# Patient Record
Sex: FEMALE | Race: WHITE | URBAN - METROPOLITAN AREA
[De-identification: names, ages, dates, MRNs, and addresses within clinical notes are randomized per-mention and may not be internally consistent; named-entity substitution may affect disease eponyms.]

---

## 2020-08-12 ENCOUNTER — HOSPITAL ENCOUNTER (OUTPATIENT)
Age: 20
Setting detail: SPECIMEN
Discharge: HOME | End: 2020-08-12
Payer: MEDICARE

## 2020-08-13 ENCOUNTER — NURSE ONLY (OUTPATIENT)
Dept: OBGYN CLINIC | Age: 20
End: 2020-08-13

## 2020-08-13 LAB
DIRECT EXAM: ABNORMAL
Lab: ABNORMAL
SPECIMEN DESCRIPTION: ABNORMAL

## 2020-08-14 LAB
C TRACH DNA GENITAL QL NAA+PROBE: NEGATIVE
N. GONORRHOEAE DNA: NEGATIVE
SPECIMEN DESCRIPTION: NORMAL

## 2020-08-21 VITALS — HEIGHT: 61 IN

## 2020-08-21 PROBLEM — R10.2 PELVIC AND PERINEAL PAIN: Status: ACTIVE | Noted: 2020-08-21

## 2020-08-21 RX ORDER — METRONIDAZOLE 500 MG/1
500 TABLET ORAL 3 TIMES DAILY
COMMUNITY
End: 2020-09-15 | Stop reason: ALTCHOICE

## 2020-09-15 ENCOUNTER — OFFICE VISIT (OUTPATIENT)
Dept: OBGYN CLINIC | Age: 20
End: 2020-09-15
Payer: MEDICARE

## 2020-09-15 VITALS — WEIGHT: 198.2 LBS | BODY MASS INDEX: 37.45 KG/M2 | SYSTOLIC BLOOD PRESSURE: 117 MMHG | DIASTOLIC BLOOD PRESSURE: 82 MMHG

## 2020-09-15 PROCEDURE — 99213 OFFICE O/P EST LOW 20 MIN: CPT | Performed by: NURSE PRACTITIONER

## 2020-09-15 NOTE — PROGRESS NOTES
PROBLEM VISIT     Date of service: 9/15/2020    Kayley Lynne  Is a 21 y.o. female    PT's PCP is: Yaniar Booth PA-C     : 2000                                             Subjective:       No LMP recorded. (Menstrual status: Irregular periods). OB History    Para Term  AB Living   2 2 2     2   SAB TAB Ectopic Molar Multiple Live Births             2      # Outcome Date GA Lbr Carter/2nd Weight Sex Delivery Anes PTL Lv   2 Term 10/04/19 38w0d   F  Local  AVA   1 Term 17 38w0d   F Vag-Spont Local N AVA      Birth Comments: short cervix @ 20wks with bulging membranes      Complications: Other Excessive Bleeding        Social History     Tobacco Use   Smoking Status Former Smoker    Types: Cigarettes    Last attempt to quit: 2019    Years since quittin.1   Smokeless Tobacco Never Used        Social History     Substance and Sexual Activity   Alcohol Use Yes       Allergies: Benzocaine      Current Outpatient Medications:     Etonogestrel (Dianne Moritz SC), Inject into the skin, Disp: , Rfl:     Social History     Substance and Sexual Activity   Sexual Activity Yes    Partners: Male    Birth control/protection: Implant       Chief Complaint   Patient presents with    Follow-up     follow up usn for low abd/pelvic pain. Most of pain is in low back area. PE:  Vital Signs  Blood pressure 117/82, weight 198 lb 3.2 oz (89.9 kg). HPI: Patient is here following usn of low back pain/pelvic pain. Reports has been present since delivery of 1st child in  and has become more consistent since delivery of 2nd child, 2019. States it is dull/achy. Patients back always hurts, has scoliosis. States the pain starts low and shoots up spine. Reports there was an incident when she was walking where she injured back.  Encouraged chiropractic care      PT denies fever, chills, nausea and vomiting       Review of Systems   Gastrointestinal: Negative for abdominal distention, constipation and diarrhea. Genitourinary: Positive for pelvic pain. Musculoskeletal: Positive for back pain. Objective     Pelvic Exam: GENITAL/URINARY:  External Genitalia:  General appearance; normal, Hair distribution; normal, Lesions absent  Urethral Meatus:  Size normal, Location normal, Lesions absent, Prolapse absent  Urethra: Fullness absent, Masses absent  Bladder:  Fullness absent, Masses absent, Tenderness absent, Cystocele absent  Vagina:  General appearance normal, Estrogen effect normal, Discharge absent, Lesions absent, Pelvic support normal  Cervix:  General appearance normal, Lesions absent, Discharge absent, Tenderness absent, Enlargement absent, Nodularity absent  Uterus:  Size normal, Tenderness absent  Adenexa: Masses absent, Tenderness absent  Anus/Perineum:  Lesions absent and Masses absent                                    Vaginal discharge: no vaginal discharge     Assessment and Plan          Diagnosis Orders   1. Pelvic pain  Vaginitis DNA Probe   2. Chronic low back pain, unspecified back pain laterality, unspecified whether sciatica present         normal limits usn. Reports abd pain improved a little with treatment of BV, will repeat culture. Patient currently has Nexplanon If pelvic pain unresolved could move forth with diagnostic lap. Encouraged follow up with PCP for eval of back pain. I have discontinued Babita Bailey's metroNIDAZOLE. I am also having her maintain her Etonogestrel (Mireya Reasons). Return if symptoms worsen or fail to improve. There are no Patient Instructions on file for this visit. Over 50% of time spent on counseling and care coordination on: see assessment and plan,  She was also counseled on her preventative health maintenance recommendations and follow-up.         FF time: 15 min      Nuzhat Severrobert Ray,9/27/2020 10:47 AM

## 2020-09-16 ENCOUNTER — HOSPITAL ENCOUNTER (OUTPATIENT)
Age: 20
Setting detail: SPECIMEN
Discharge: HOME | End: 2020-09-16
Payer: MEDICARE

## 2020-09-16 LAB
DIRECT EXAM: NORMAL
Lab: NORMAL
SPECIMEN DESCRIPTION: NORMAL

## 2020-09-27 ASSESSMENT — ENCOUNTER SYMPTOMS
ABDOMINAL DISTENTION: 0
DIARRHEA: 0
CONSTIPATION: 0
BACK PAIN: 1

## 2021-06-15 ENCOUNTER — TELEPHONE (OUTPATIENT)
Dept: OBGYN CLINIC | Age: 21
End: 2021-06-15

## 2021-07-01 ENCOUNTER — PROCEDURE VISIT (OUTPATIENT)
Dept: OBGYN CLINIC | Age: 21
End: 2021-07-01
Payer: MEDICARE

## 2021-07-01 VITALS — DIASTOLIC BLOOD PRESSURE: 74 MMHG | WEIGHT: 207 LBS | SYSTOLIC BLOOD PRESSURE: 117 MMHG | BODY MASS INDEX: 39.11 KG/M2

## 2021-07-01 DIAGNOSIS — Z30.46 ENCOUNTER FOR REMOVAL OF SUBDERMAL CONTRACEPTIVE IMPLANT: Primary | ICD-10-CM

## 2021-07-01 PROCEDURE — 11982 REMOVE DRUG IMPLANT DEVICE: CPT | Performed by: NURSE PRACTITIONER

## 2021-07-01 RX ORDER — NAPROXEN 500 MG/1
500 TABLET ORAL 2 TIMES DAILY PRN
COMMUNITY
Start: 2020-11-03 | End: 2021-11-03

## 2021-07-01 RX ORDER — PROMETHAZINE HYDROCHLORIDE 25 MG/1
25 TABLET ORAL EVERY 6 HOURS PRN
COMMUNITY
Start: 2021-06-18 | End: 2022-06-18

## 2021-07-01 RX ORDER — ACETAMINOPHEN 325 MG/1
650 TABLET ORAL EVERY 6 HOURS PRN
COMMUNITY

## 2021-07-01 RX ORDER — IBUPROFEN 600 MG/1
600 TABLET ORAL EVERY 6 HOURS PRN
COMMUNITY

## 2021-07-01 NOTE — PROGRESS NOTES
24 y.o.  2021      Hussein Rodriguez is a 24 y.o. female is requesting to have her Nexplanon removed due to mood swings. She does not have any other problems today. She declines any hormonal form of cycle control. Past Medical History:   Diagnosis Date    Allergic headache     Allergic rhinitis     Anxiety     BV (bacterial vaginosis)     Depression          Past Surgical History:   Procedure Laterality Date    ADENOIDECTOMY      CHOLECYSTECTOMY      TONSILLECTOMY  2010    TYMPANOSTOMY TUBE PLACEMENT         Family History   Problem Relation Age of Onset    Diabetes Paternal Grandmother     Diabetes Maternal Grandmother     Stroke Maternal Grandmother     Alzheimer's Disease Maternal Grandfather     Diabetes Mother     Depression Mother     Osteoarthritis Mother     Mental Illness Brother     Asthma Sister     Mental Illness Sister        Social History     Tobacco Use    Smoking status: Former Smoker     Types: Cigarettes     Quit date: 2019     Years since quittin.8    Smokeless tobacco: Never Used   Vaping Use    Vaping Use: Never used   Substance Use Topics    Alcohol use: Yes    Drug use: Not Currently       Current Outpatient Medications on File Prior to Visit   Medication Sig Dispense Refill    naproxen (NAPROSYN) 500 MG tablet Take 500 mg by mouth 2 times daily as needed      promethazine (PHENERGAN) 25 MG tablet Take 25 mg by mouth every 6 hours as needed      acetaminophen (TYLENOL) 325 MG tablet Take 650 mg by mouth every 6 hours as needed      ibuprofen (ADVIL;MOTRIN) 600 MG tablet Take 600 mg by mouth every 6 hours as needed      Etonogestrel (NEXPLANON SC) Inject into the skin       No current facility-administered medications on file prior to visit.        Allergies as of 2021 - Fully Reviewed 2021   Allergen Reaction Noted    Benzocaine  09/15/2020         OB History    Para Term  AB Living   2 2 2     2   SAB TAB

## 2021-07-27 ENCOUNTER — HOSPITAL ENCOUNTER (OUTPATIENT)
Age: 21
Setting detail: SPECIMEN
Discharge: HOME | End: 2021-07-27
Payer: MEDICARE

## 2021-07-27 ENCOUNTER — OFFICE VISIT (OUTPATIENT)
Dept: OBGYN CLINIC | Age: 21
End: 2021-07-27
Payer: MEDICARE

## 2021-07-27 VITALS — WEIGHT: 208 LBS | BODY MASS INDEX: 39.3 KG/M2 | DIASTOLIC BLOOD PRESSURE: 82 MMHG | SYSTOLIC BLOOD PRESSURE: 127 MMHG

## 2021-07-27 DIAGNOSIS — Z01.419 WOMEN'S ANNUAL ROUTINE GYNECOLOGICAL EXAMINATION: Primary | ICD-10-CM

## 2021-07-27 PROCEDURE — 99395 PREV VISIT EST AGE 18-39: CPT | Performed by: NURSE PRACTITIONER

## 2021-07-27 NOTE — PROGRESS NOTES
YEARLY PHYSICAL    Date of service: 2021    Tino Valera  Is a 24 y.o.  female    PT's PCP is: Kaleigh Farfan PA-C     : 2000                                             Subjective:       Patient's last menstrual period was 2021. Are your menses regular: yes    OB History    Para Term  AB Living   2 2 2     2   SAB TAB Ectopic Molar Multiple Live Births             2      # Outcome Date GA Lbr Carter/2nd Weight Sex Delivery Anes PTL Lv   2 Term 10/04/19 38w0d   F  Local  AVA   1 Term 17 38w0d   F Vag-Spont Local N AVA      Birth Comments: short cervix @ 20wks with bulging membranes      Complications:  Other Excessive Bleeding        Social History     Tobacco Use   Smoking Status Former Smoker    Types: Cigarettes    Quit date: 2019    Years since quittin.9   Smokeless Tobacco Never Used        Social History     Substance and Sexual Activity   Alcohol Use Yes       Family History   Problem Relation Age of Onset    Diabetes Paternal Grandmother     Diabetes Maternal Grandmother     Stroke Maternal Grandmother     Alzheimer's Disease Maternal Grandfather     Diabetes Mother     Depression Mother     Osteoarthritis Mother     Mental Illness Brother     Asthma Sister     Mental Illness Sister        Any family history of breast or ovarian cancer: No    Any family history of blood clots: No    Allergies: Benzocaine      Current Outpatient Medications:     naproxen (NAPROSYN) 500 MG tablet, Take 500 mg by mouth 2 times daily as needed, Disp: , Rfl:     acetaminophen (TYLENOL) 325 MG tablet, Take 650 mg by mouth every 6 hours as needed, Disp: , Rfl:     ibuprofen (ADVIL;MOTRIN) 600 MG tablet, Take 600 mg by mouth every 6 hours as needed, Disp: , Rfl:     promethazine (PHENERGAN) 25 MG tablet, Take 25 mg by mouth every 6 hours as needed, Disp: , Rfl:     Social History     Substance and Sexual Activity   Sexual Activity Yes    Partners: Male    Birth control/protection: Condom       Any bleeding or pain with intercourse: No    Last pap: 2015    Last HPV: n/a    Last Mammogram: n/a    Do you do self breast exams: encouraged monthly SBE     Past Medical History:   Diagnosis Date    Allergic headache     Allergic rhinitis     Anxiety     BV (bacterial vaginosis)     Depression        Past Surgical History:   Procedure Laterality Date    ADENOIDECTOMY      CHOLECYSTECTOMY      TONSILLECTOMY  2010    TYMPANOSTOMY TUBE PLACEMENT  2002       Family History   Problem Relation Age of Onset    Diabetes Paternal Grandmother     Diabetes Maternal Grandmother     Stroke Maternal Grandmother     Alzheimer's Disease Maternal Grandfather     Diabetes Mother     Depression Mother     Osteoarthritis Mother     Mental Illness Brother     Asthma Sister     Mental Illness Sister        Chief Complaint   Patient presents with    Gynecologic Exam     Pap due. Feeling well, voices no concerns. Declines STD testing. PE:  Vital Signs  Blood pressure 127/82, weight 208 lb (94.3 kg), last menstrual period 07/21/2021. Estimated body mass index is 39.3 kg/m² as calculated from the following:    Height as of 8/21/20: 5' 1\" (1.549 m). Weight as of this encounter: 208 lb (94.3 kg). HPI: Patient presents today for annual exam. Feeling well, voices no concerns. Denies breast/pelvic pain. Due for pap, declines STD screening. Review of Systems   All other systems reviewed and are negative. Objective  Heent:   negative   Cor: regular rate and rhythm, no murmurs              Pul:clear to auscultation bilaterally- no wheezes, rales or rhonchi, normal air movement, no respiratory distress      GI: Abdomen soft, non-tender.  BS normal. No masses,  No organomegaly           Extremities: normal strength, tone, and muscle mass, ROM of all joints is normal   Breasts: Breast:normal appearance, no masses or tenderness, No nipple retraction or dimpling, No nipple discharge or bleeding   Pelvic Exam: GENITAL/URINARY:  External Genitalia:  General appearance; normal, Hair distribution; normal, Lesions absent  Urethral Meatus:  Size normal, Location normal, Lesions absent, Prolapse absent  Urethra: Fullness absent, Masses absent  Bladder:  Fullness absent, Masses absent, Tenderness absent, Cystocele absent  Vagina:  General appearance normal, Estrogen effect normal, Discharge absent, Lesions absent, Pelvic support normal  Cervix:  General appearance normal, Lesions absent, Discharge absent, Tenderness absent, Enlargement absent, Nodularity absent  Uterus:  Size normal, Tenderness absent  Adenexa: Masses absent, Tenderness absent  Anus/Perineum:  Lesions absent and Masses absent                                    Vaginal discharge: no vaginal discharge   Chaperone: not present                          Assessment and Plan          Diagnosis Orders   1. Women's annual routine gynecological examination  PAP SMEAR             I have discontinued Babita Bailey's Etonogestrel (Ranny Picket). I am also having her maintain her naproxen, acetaminophen, ibuprofen, and promethazine. Return in about 1 year (around 7/27/2022) for yearly. She was also counseled on her preventative health maintenance recommendations and follow-up. There are no Patient Instructions on file for this visit.     MAL Cabrales NP,7/27/2021 1:16 PM

## 2021-08-05 ENCOUNTER — OFFICE VISIT (OUTPATIENT)
Dept: OBGYN CLINIC | Age: 21
End: 2021-08-05
Payer: MEDICARE

## 2021-08-05 ENCOUNTER — HOSPITAL ENCOUNTER (OUTPATIENT)
Age: 21
Setting detail: SPECIMEN
Discharge: HOME | End: 2021-08-05
Payer: MEDICARE

## 2021-08-05 VITALS — WEIGHT: 208 LBS | BODY MASS INDEX: 39.3 KG/M2 | SYSTOLIC BLOOD PRESSURE: 120 MMHG | DIASTOLIC BLOOD PRESSURE: 80 MMHG

## 2021-08-05 DIAGNOSIS — N93.9 ABNORMAL UTERINE AND VAGINAL BLEEDING, UNSPECIFIED: Primary | ICD-10-CM

## 2021-08-05 DIAGNOSIS — N93.9 ABNORMAL UTERINE AND VAGINAL BLEEDING, UNSPECIFIED: ICD-10-CM

## 2021-08-05 LAB
DIRECT EXAM: ABNORMAL
HCG QUANTITATIVE: <1 IU/L
Lab: ABNORMAL
SPECIMEN DESCRIPTION: ABNORMAL

## 2021-08-05 PROCEDURE — 99213 OFFICE O/P EST LOW 20 MIN: CPT | Performed by: NURSE PRACTITIONER

## 2021-08-05 PROCEDURE — G8427 DOCREV CUR MEDS BY ELIG CLIN: HCPCS | Performed by: NURSE PRACTITIONER

## 2021-08-05 PROCEDURE — G8417 CALC BMI ABV UP PARAM F/U: HCPCS | Performed by: NURSE PRACTITIONER

## 2021-08-05 PROCEDURE — 1036F TOBACCO NON-USER: CPT | Performed by: NURSE PRACTITIONER

## 2021-08-05 NOTE — PROGRESS NOTES
PROBLEM VISIT     Date of service: 2021    Carolee Bassett  Is a 24 y.o. female    PT's PCP is: Domenic Skelton PA-C     : 2000                                             Subjective:       Patient's last menstrual period was 2021. OB History    Para Term  AB Living   2 2 2     2   SAB TAB Ectopic Molar Multiple Live Births             2      # Outcome Date GA Lbr Carter/2nd Weight Sex Delivery Anes PTL Lv   2 Term 10/04/19 38w0d   F  Local  AVA   1 Term 17 38w0d   F Vag-Spont Local N AVA      Birth Comments: short cervix @ 20wks with bulging membranes      Complications: Other Excessive Bleeding        Social History     Tobacco Use   Smoking Status Former Smoker    Types: Cigarettes    Quit date: 2019    Years since quittin.9   Smokeless Tobacco Never Used        Social History     Substance and Sexual Activity   Alcohol Use Yes       Allergies: Benzocaine      Current Outpatient Medications:     naproxen (NAPROSYN) 500 MG tablet, Take 500 mg by mouth 2 times daily as needed, Disp: , Rfl:     acetaminophen (TYLENOL) 325 MG tablet, Take 650 mg by mouth every 6 hours as needed, Disp: , Rfl:     ibuprofen (ADVIL;MOTRIN) 600 MG tablet, Take 600 mg by mouth every 6 hours as needed, Disp: , Rfl:     promethazine (PHENERGAN) 25 MG tablet, Take 25 mg by mouth every 6 hours as needed, Disp: , Rfl:     Social History     Substance and Sexual Activity   Sexual Activity Yes    Partners: Male    Birth control/protection: Condom     Chief Complaint   Patient presents with    Menstrual Problem     C/O having nl menses on , and then started bleeding heavily on . Went to ER and had labs and UPT done. Denies pain. PE:  Vital Signs  Blood pressure 120/80, weight 208 lb (94.3 kg), last menstrual period 2021. HPI: Patient presents for ER follow up. States LMP was  and then while working she started bleeding heavily on .  She was seen in the ER that evening- labs and UPT negative. Continues to have vaginal bleeding changing tampons 2-3 times daily. Desires serum hcg    PT denies fever, chills, nausea and vomiting       Review of Systems   Constitutional: Negative. Genitourinary: Positive for menstrual problem and vaginal bleeding. Negative for dysuria, frequency, pelvic pain and vaginal discharge. Physical Exam  Constitutional:       Appearance: Normal appearance. HENT:      Head: Normocephalic. Pulmonary:      Effort: Pulmonary effort is normal.   Genitourinary:     General: Normal vulva. Vagina: Bleeding present. No tenderness. Cervix: Normal.      Uterus: Not tender. Adnexa:         Right: No tenderness. Left: No tenderness. Musculoskeletal:         General: Normal range of motion. Neurological:      General: No focal deficit present. Mental Status: She is alert. Psychiatric:         Mood and Affect: Mood normal.         Behavior: Behavior normal.       Chaperone: not present     Assessment and Plan          Diagnosis Orders   1. Abnormal uterine and vaginal bleeding, unspecified  hCG, Quantitative, Pregnancy    Vaginitis DNA Probe    Chlamydia Trachomatis & Neisseria gonorrhoeae (GC) by amplified detection             I am having Babita Bailey maintain her naproxen, acetaminophen, ibuprofen, and promethazine. Return for gyn US. There are no Patient Instructions on file for this visit.     MAL Nj NP,8/5/2021 3:46 PM

## 2021-08-06 LAB
C TRACH DNA GENITAL QL NAA+PROBE: NEGATIVE
CYTOLOGY REPORT: NORMAL
N. GONORRHOEAE DNA: NEGATIVE
SPECIMEN DESCRIPTION: NORMAL

## 2021-08-08 NOTE — RESULT ENCOUNTER NOTE
Positive BV.  Please notify patient of these results and send Flagyl 500mg BID x7 days- dispense #14, no refills     Negative std screening, negative hcg

## 2021-08-09 RX ORDER — METRONIDAZOLE 500 MG/1
500 TABLET ORAL 2 TIMES DAILY
Qty: 14 TABLET | Refills: 0 | Status: SHIPPED | OUTPATIENT
Start: 2021-08-09 | End: 2021-08-16

## 2021-08-10 LAB
HPV SAMPLE: ABNORMAL
HPV, GENOTYPE 16: NOT DETECTED
HPV, GENOTYPE 18: NOT DETECTED
HPV, HIGH RISK OTHER: DETECTED
HPV, INTERPRETATION: ABNORMAL
SPECIMEN DESCRIPTION: ABNORMAL

## 2021-08-10 NOTE — RESULT ENCOUNTER NOTE
Please notify patient of these results. ASCUS with positive HRHPV. Per current [facility]P guidelines she will need repeat pap in 1 year.  Thanks

## 2021-08-17 ENCOUNTER — OFFICE VISIT (OUTPATIENT)
Dept: OBGYN CLINIC | Age: 21
End: 2021-08-17
Payer: MEDICARE

## 2021-08-17 VITALS — DIASTOLIC BLOOD PRESSURE: 73 MMHG | WEIGHT: 206 LBS | SYSTOLIC BLOOD PRESSURE: 121 MMHG | BODY MASS INDEX: 38.92 KG/M2

## 2021-08-17 DIAGNOSIS — N93.9 ABNORMAL UTERINE AND VAGINAL BLEEDING, UNSPECIFIED: Primary | ICD-10-CM

## 2021-08-17 PROCEDURE — 1036F TOBACCO NON-USER: CPT | Performed by: NURSE PRACTITIONER

## 2021-08-17 PROCEDURE — 99213 OFFICE O/P EST LOW 20 MIN: CPT | Performed by: NURSE PRACTITIONER

## 2021-08-17 PROCEDURE — G8417 CALC BMI ABV UP PARAM F/U: HCPCS | Performed by: NURSE PRACTITIONER

## 2021-08-17 PROCEDURE — G8427 DOCREV CUR MEDS BY ELIG CLIN: HCPCS | Performed by: NURSE PRACTITIONER

## 2021-08-17 NOTE — PROGRESS NOTES
PROBLEM VISIT     Date of service: 2021    Dee Barragan  Is a 24 y.o. female    PT's PCP is: Talon Mcelroy PA-C     : 2000                                             Subjective:       Patient's last menstrual period was 2021. OB History    Para Term  AB Living   2 2 2     2   SAB TAB Ectopic Molar Multiple Live Births             2      # Outcome Date GA Lbr Carter/2nd Weight Sex Delivery Anes PTL Lv   2 Term 10/04/19 38w0d   F  Local  AVA   1 Term 17 38w0d   F Vag-Spont Local N AVA      Birth Comments: short cervix @ 20wks with bulging membranes      Complications: Other Excessive Bleeding        Social History     Tobacco Use   Smoking Status Former Smoker    Types: Cigarettes    Quit date: 2019    Years since quittin.9   Smokeless Tobacco Never Used        Social History     Substance and Sexual Activity   Alcohol Use Yes       Allergies: Benzocaine      Current Outpatient Medications:     naproxen (NAPROSYN) 500 MG tablet, Take 500 mg by mouth 2 times daily as needed, Disp: , Rfl:     acetaminophen (TYLENOL) 325 MG tablet, Take 650 mg by mouth every 6 hours as needed, Disp: , Rfl:     ibuprofen (ADVIL;MOTRIN) 600 MG tablet, Take 600 mg by mouth every 6 hours as needed, Disp: , Rfl:     promethazine (PHENERGAN) 25 MG tablet, Take 25 mg by mouth every 6 hours as needed, Disp: , Rfl:     Social History     Substance and Sexual Activity   Sexual Activity Yes    Partners: Male    Birth control/protection: Condom     Chief Complaint   Patient presents with    Follow-up     Follow up usn for AUB. PE:  Vital Signs  Blood pressure 121/73, weight 206 lb (93.4 kg), last menstrual period 2021. HPI: Patient presents today following ultrasound for AUB. Reports bleeding has now stopped. Currently taking Flagyl for bacterial vaginosis, just picked up prescription two days ago. Denies any new symptoms.      PT denies fever, chills, nausea and vomiting       Review of Systems   Constitutional: Negative. Genitourinary: Positive for menstrual problem. Negative for dysuria, frequency, pelvic pain, vaginal bleeding and vaginal discharge. Physical Exam  Constitutional:       Appearance: Normal appearance. She is obese. HENT:      Head: Normocephalic. Pulmonary:      Effort: Pulmonary effort is normal.   Musculoskeletal:         General: Normal range of motion. Neurological:      General: No focal deficit present. Mental Status: She is alert. Psychiatric:         Mood and Affect: Mood normal.         Behavior: Behavior normal.       Cycle day 16     Uterus measures: 7.9 x 4.9  x 3.6 cm  Endometrium measures: 6.0 mm  Dominant follicle on the right ovary karla: 1.5 x 1.8 x 1.8 cm  Left ovary appears WNL    Assessment and Plan          Diagnosis Orders   1. Abnormal uterine and vaginal bleeding, unspecified         reviewed ultrasound. Reviewed options available for cycle control. Patient declines any at this time and will continue to monitor menses. Reviewed reportable s/s. I am having Babita Bailey maintain her naproxen, acetaminophen, ibuprofen, and promethazine. Return if symptoms worsen or fail to improve. There are no Patient Instructions on file for this visit. Over 50% of time spent on counseling and care coordination on: see assessment and plan,  She was also counseled on her preventative health maintenance recommendations and follow-up.         FF time: 20 min      MAL Brice NP,8/17/2021 9:19 PM

## 2022-07-28 ENCOUNTER — HOSPITAL ENCOUNTER (OUTPATIENT)
Age: 22
Setting detail: SPECIMEN
Discharge: HOME | End: 2022-07-28

## 2022-07-28 ENCOUNTER — OFFICE VISIT (OUTPATIENT)
Dept: OBGYN CLINIC | Age: 22
End: 2022-07-28
Payer: MEDICARE

## 2022-07-28 VITALS
DIASTOLIC BLOOD PRESSURE: 84 MMHG | HEIGHT: 61 IN | BODY MASS INDEX: 37.16 KG/M2 | WEIGHT: 196.8 LBS | SYSTOLIC BLOOD PRESSURE: 121 MMHG

## 2022-07-28 DIAGNOSIS — R87.810 ASCUS WITH POSITIVE HIGH RISK HPV CERVICAL: ICD-10-CM

## 2022-07-28 DIAGNOSIS — R87.610 ASCUS WITH POSITIVE HIGH RISK HPV CERVICAL: ICD-10-CM

## 2022-07-28 DIAGNOSIS — Z01.419 WOMEN'S ANNUAL ROUTINE GYNECOLOGICAL EXAMINATION: Primary | ICD-10-CM

## 2022-07-28 PROCEDURE — 99395 PREV VISIT EST AGE 18-39: CPT | Performed by: NURSE PRACTITIONER

## 2022-07-28 RX ORDER — ALBUTEROL SULFATE 90 UG/1
2 AEROSOL, METERED RESPIRATORY (INHALATION)
COMMUNITY
Start: 2021-09-23 | End: 2022-09-23

## 2022-07-28 ASSESSMENT — ENCOUNTER SYMPTOMS
DIARRHEA: 0
CONSTIPATION: 0
ABDOMINAL PAIN: 0
SHORTNESS OF BREATH: 0

## 2022-07-28 NOTE — PROGRESS NOTES
YEARLY PHYSICAL    Date of service: 2022    Magaly Stark  Is a 25 y.o.   female    PT's PCP is: Marta Lopez PA-C     : 2000                                         Chaperone for Intimate Exam  Chaperone was offered as part of the rooming process. Patient declined and agrees to continue with exam without a chaperone. Chaperone: n/a      Subjective:       Patient's last menstrual period was 2022. Are your menses regular: yes    OB History    Para Term  AB Living   2 2 2     2   SAB IAB Ectopic Molar Multiple Live Births             2      # Outcome Date GA Lbr Carter/2nd Weight Sex Delivery Anes PTL Lv   2 Term 10/04/19 38w0d   F  Local  AVA   1 Term 17 38w0d   F Vag-Spont Local N AVA      Birth Comments: short cervix @ 20wks with bulging membranes      Complications:  Other Excessive Bleeding        Social History     Tobacco Use   Smoking Status Former    Types: Cigarettes    Quit date: 2019    Years since quittin.9   Smokeless Tobacco Never        Social History     Substance and Sexual Activity   Alcohol Use Not Currently       Family History   Problem Relation Age of Onset    Diabetes Paternal Grandmother     Diabetes Maternal Grandmother     Stroke Maternal Grandmother     Alzheimer's Disease Maternal Grandfather     Stroke Maternal Grandfather     Diabetes Mother     Depression Mother     Osteoarthritis Mother     Mental Illness Brother     Asthma Sister     Mental Illness Sister        Any family history of breast or ovarian cancer: No    Any family history of blood clots: No      Allergies: Benzocaine      Current Outpatient Medications:     albuterol sulfate HFA (PROVENTIL;VENTOLIN;PROAIR) 108 (90 Base) MCG/ACT inhaler, Inhale 2 puffs into the lungs, Disp: , Rfl:     naproxen (NAPROSYN) 500 MG tablet, Take 500 mg by mouth 2 times daily as needed, Disp: , Rfl:     acetaminophen (TYLENOL) 325 MG tablet, Take 650 mg by mouth every 6 hours as needed, Disp: , Rfl:     ibuprofen (ADVIL;MOTRIN) 600 MG tablet, Take 600 mg by mouth every 6 hours as needed, Disp: , Rfl:     Social History     Substance and Sexual Activity   Sexual Activity Yes    Partners: Male    Birth control/protection: Condom       Any bleeding or pain with intercourse: No    Last Yearly:  7/27/21    Last pap: 7/27/21- ASCUS    Last HPV: 7/27/21- Positive    Last Mammogram: n/a    Last Dexascan n/a    Last colorectal screen- type:n/a*  date  n/a    Do you do self breast exams: encouraged     Past Medical History:   Diagnosis Date    Abnormal Pap smear of cervix 07/27/2021    ASCUS pos HPV    Allergic headache     Allergic rhinitis     Anxiety     BV (bacterial vaginosis)     Depression     Trauma        Past Surgical History:   Procedure Laterality Date    ADENOIDECTOMY      CHOLECYSTECTOMY      TONSILLECTOMY  2010    TYMPANOSTOMY TUBE PLACEMENT  2002       Family History   Problem Relation Age of Onset    Diabetes Paternal Grandmother     Diabetes Maternal Grandmother     Stroke Maternal Grandmother     Alzheimer's Disease Maternal Grandfather     Stroke Maternal Grandfather     Diabetes Mother     Depression Mother     Osteoarthritis Mother     Mental Illness Brother     Asthma Sister     Mental Illness Sister        Chief Complaint   Patient presents with    Annual Exam     Last pap 7/27/21- ASCUS pos HPV. Reports menses will stop and restart again. PE:  Vital Signs  Blood pressure 121/84, height 5' 1\" (1.549 m), weight 196 lb 12.8 oz (89.3 kg), last menstrual period 07/23/2022. Estimated body mass index is 37.19 kg/m² as calculated from the following:    Height as of this encounter: 5' 1\" (1.549 m). Weight as of this encounter: 196 lb 12.8 oz (89.3 kg). HPI: Patient presents today for annual exam. Feeling well, voices no concerns. Denies breast/pelvic pain. Due for repeat pap; ASCUS with pos HPV.  Mammogram not indicated. Wellness reviewed. Reports monthly menses; states thought menses over yesterday but now spotting today. Review of Systems   Constitutional:  Negative for chills, fatigue and fever. Respiratory:  Negative for shortness of breath. Cardiovascular:  Negative for chest pain. Gastrointestinal:  Negative for abdominal pain, constipation and diarrhea. Genitourinary:  Negative for dysuria, enuresis, frequency, menstrual problem, pelvic pain, urgency and vaginal bleeding. Neurological:  Negative for dizziness, light-headedness and headaches. Physical Exam  Constitutional:       General: She is not in acute distress. Appearance: Normal appearance. She is not ill-appearing. Genitourinary:      Vulva, bladder and urethral meatus normal.      No lesions in the vagina. Right Labia: No rash or lesions. Left Labia: No lesions or rash. No vaginal discharge. No vaginal prolapse present. No vaginal atrophy present. Right Adnexa: not tender and no mass present. Left Adnexa: not tender and no mass present. No cervical motion tenderness, discharge or friability. No parametrium nodularity present. Uterus is not enlarged or tender. No uterine mass detected. No urethral prolapse, tenderness or mass present. Breasts:     Right: No mass, nipple discharge, skin change or tenderness. Left: No mass, nipple discharge, skin change or tenderness. HENT:      Head: Normocephalic and atraumatic. Eyes:      Extraocular Movements: Extraocular movements intact. Pupils: Pupils are equal, round, and reactive to light. Cardiovascular:      Rate and Rhythm: Normal rate. Pulmonary:      Effort: Pulmonary effort is normal.   Abdominal:      Palpations: Abdomen is soft. Tenderness: There is no abdominal tenderness. There is no guarding or rebound. Musculoskeletal:         General: Normal range of motion.    Neurological:      General: No focal deficit present. Mental Status: She is alert and oriented to person, place, and time. Skin:     General: Skin is warm and dry. Psychiatric:         Mood and Affect: Mood normal.         Behavior: Behavior normal.                       Assessment and Plan          Diagnosis Orders   1. Women's annual routine gynecological examination        2. ASCUS with positive high risk HPV cervical  PAP SMEAR          Repeat Annual every 1 year  Cervical Cytology Evaluation begins at 24years old. If Negative Cytology, Follow-up screening per current guidelines. Mammograms every 1year. If 37 yo and last mammogram was negative. Routine healthmaintenance per patients PCP. I am having Babita Bailey maintain her naproxen, acetaminophen, ibuprofen, and albuterol sulfate HFA. Return in about 1 year (around 7/28/2023) for yearly. She was also counseled on her preventative health maintenance recommendations and follow-up. There are no Patient Instructions on file for this visit.     MAL Mejias NP,7/28/2022 1:18 PM

## 2022-08-08 LAB — CYTOLOGY REPORT: NORMAL
